# Patient Record
Sex: MALE | Race: WHITE | Employment: FULL TIME | URBAN - METROPOLITAN AREA
[De-identification: names, ages, dates, MRNs, and addresses within clinical notes are randomized per-mention and may not be internally consistent; named-entity substitution may affect disease eponyms.]

---

## 2019-11-03 ENCOUNTER — HOSPITAL ENCOUNTER (EMERGENCY)
Facility: HOSPITAL | Age: 34
Discharge: HOME | End: 2019-11-03
Attending: EMERGENCY MEDICINE
Payer: COMMERCIAL

## 2019-11-03 VITALS
TEMPERATURE: 99 F | HEART RATE: 88 BPM | DIASTOLIC BLOOD PRESSURE: 95 MMHG | SYSTOLIC BLOOD PRESSURE: 145 MMHG | WEIGHT: 240 LBS | RESPIRATION RATE: 18 BRPM | BODY MASS INDEX: 32.51 KG/M2 | HEIGHT: 72 IN | OXYGEN SATURATION: 100 %

## 2019-11-03 DIAGNOSIS — H10.33 ACUTE CONJUNCTIVITIS OF BOTH EYES, UNSPECIFIED ACUTE CONJUNCTIVITIS TYPE: Primary | ICD-10-CM

## 2019-11-03 PROCEDURE — 99283 EMERGENCY DEPT VISIT LOW MDM: CPT

## 2019-11-03 RX ORDER — POLYMYXIN B SULFATE AND TRIMETHOPRIM 1; 10000 MG/ML; [USP'U]/ML
1 SOLUTION OPHTHALMIC
Qty: 10 ML | Refills: 0 | Status: SHIPPED | OUTPATIENT
Start: 2019-11-03 | End: 2019-11-08

## 2019-11-03 NOTE — ED PROVIDER NOTES
Patient Seen in: [facility] Emergency Department      History   Patient presents with: Eye Visual Problem (opthalmic)    Stated Complaint: pink eye    HPI    71-year-old male presents for complaint of red eyes and drainage.   Symptoms began overni nursing note reviewed. Constitutional:       General: He is not in acute distress. Appearance: Normal appearance. HENT:      Head: Normocephalic and atraumatic. Jaw: No trismus. Right Ear: Tympanic membrane normal. No mastoid tenderness. other constellation of symptoms this is likely viral however he is given a prescription for eyedrops. There are no foreign bodies. I do not suspect any abrasions ulcers. There is no evidence of any orbital or periorbital cellulitis.   Primary care follow

## 2019-11-03 NOTE — ED INITIAL ASSESSMENT (HPI)
Diarrhea x24 hours, denies n/v, abd pain. Here primarily for bilateral eye redness with watering and white discharge and crusting to bilateral eyes that occurred overnight.

## 2019-11-09 ENCOUNTER — HOSPITAL ENCOUNTER (OUTPATIENT)
Age: 34
Discharge: HOME | End: 2019-11-09
Payer: COMMERCIAL

## 2019-11-09 VITALS
RESPIRATION RATE: 20 BRPM | HEART RATE: 92 BPM | TEMPERATURE: 99 F | DIASTOLIC BLOOD PRESSURE: 82 MMHG | SYSTOLIC BLOOD PRESSURE: 145 MMHG | WEIGHT: 240 LBS | OXYGEN SATURATION: 100 % | BODY MASS INDEX: 33 KG/M2

## 2019-11-09 DIAGNOSIS — J01.40 ACUTE NON-RECURRENT PANSINUSITIS: Primary | ICD-10-CM

## 2019-11-09 PROCEDURE — 99213 OFFICE O/P EST LOW 20 MIN: CPT

## 2019-11-09 PROCEDURE — 99214 OFFICE O/P EST MOD 30 MIN: CPT

## 2019-11-09 RX ORDER — AMOXICILLIN AND CLAVULANATE POTASSIUM 875; 125 MG/1; MG/1
1 TABLET, FILM COATED ORAL 2 TIMES DAILY
Qty: 20 TABLET | Refills: 0 | Status: SHIPPED | OUTPATIENT
Start: 2019-11-09 | End: 2019-11-19

## 2019-11-09 NOTE — ED PROVIDER NOTES
Patient presents with:  Cough/URI      HPI:     Rhiannon Rey is a 36year old male with no significant past medical history presents with a chief complaint of postnasal drip, headache, facial pain and pressure, runny nose over the course of last 7 days. normal.    Patient is well-appearing on exam, nontoxic appearance. Exam as noted above. I discussed with the patient we will treat for acute sinusitis at this time based on examination symptoms.   He has tolerated Augmentin in the past.  Also, can twice d

## (undated) NOTE — ED AVS SNAPSHOT
Estefania Willis   MRN: [id number]    Department:  [facility] Emergency Department   Date of Visit:  11/3/2019           Disclosure     Insurance plans vary and the physician(s) referred by the ER may not be covered by your plan.  Please contact yo CARE PHYSICIAN AT ONCE OR RETURN IMMEDIATELY TO THE EMERGENCY DEPARTMENT. If you have been prescribed any medication(s), please fill your prescription right away and begin taking the medication(s) as directed.   If you believe that any of the medications